# Patient Record
Sex: FEMALE | Race: WHITE | NOT HISPANIC OR LATINO | Employment: UNEMPLOYED | URBAN - NONMETROPOLITAN AREA
[De-identification: names, ages, dates, MRNs, and addresses within clinical notes are randomized per-mention and may not be internally consistent; named-entity substitution may affect disease eponyms.]

---

## 2021-08-30 ENCOUNTER — HOSPITAL ENCOUNTER (EMERGENCY)
Facility: OTHER | Age: 36
Discharge: HOME | End: 2021-08-31
Attending: FAMILY MEDICINE | Admitting: FAMILY MEDICINE

## 2021-08-30 DIAGNOSIS — T78.40XA ALLERGIC REACTION, INITIAL ENCOUNTER: ICD-10-CM

## 2021-08-30 PROCEDURE — 250N000011 HC RX IP 250 OP 636: Performed by: FAMILY MEDICINE

## 2021-08-30 PROCEDURE — 99283 EMERGENCY DEPT VISIT LOW MDM: CPT | Performed by: FAMILY MEDICINE

## 2021-08-30 PROCEDURE — 258N000003 HC RX IP 258 OP 636: Performed by: FAMILY MEDICINE

## 2021-08-30 PROCEDURE — 96365 THER/PROPH/DIAG IV INF INIT: CPT | Performed by: FAMILY MEDICINE

## 2021-08-30 PROCEDURE — 96375 TX/PRO/DX INJ NEW DRUG ADDON: CPT | Performed by: FAMILY MEDICINE

## 2021-08-30 PROCEDURE — 99284 EMERGENCY DEPT VISIT MOD MDM: CPT | Mod: 25 | Performed by: FAMILY MEDICINE

## 2021-08-30 RX ORDER — IBUPROFEN 600 MG/1
600 TABLET, FILM COATED ORAL EVERY 6 HOURS PRN
COMMUNITY

## 2021-08-30 RX ORDER — SERTRALINE HYDROCHLORIDE 100 MG/1
100 TABLET, FILM COATED ORAL DAILY
COMMUNITY

## 2021-08-30 RX ORDER — GABAPENTIN 300 MG/1
600 CAPSULE ORAL 4 TIMES DAILY
COMMUNITY

## 2021-08-30 RX ORDER — METHYLPREDNISOLONE SODIUM SUCCINATE 125 MG/2ML
125 INJECTION, POWDER, LYOPHILIZED, FOR SOLUTION INTRAMUSCULAR; INTRAVENOUS ONCE
Status: COMPLETED | OUTPATIENT
Start: 2021-08-30 | End: 2021-08-30

## 2021-08-30 RX ORDER — LEVOTHYROXINE SODIUM 150 UG/1
150 TABLET ORAL DAILY
COMMUNITY

## 2021-08-30 RX ADMIN — FAMOTIDINE 20 MG: 20 INJECTION, SOLUTION INTRAVENOUS at 22:42

## 2021-08-30 RX ADMIN — SODIUM CHLORIDE 1000 ML: 9 INJECTION, SOLUTION INTRAVENOUS at 22:42

## 2021-08-30 RX ADMIN — METHYLPREDNISOLONE SODIUM SUCCINATE 125 MG: 125 INJECTION, POWDER, FOR SOLUTION INTRAMUSCULAR; INTRAVENOUS at 22:43

## 2021-08-30 ASSESSMENT — MIFFLIN-ST. JEOR: SCORE: 1574.88

## 2021-08-31 VITALS
TEMPERATURE: 97.5 F | BODY MASS INDEX: 32.47 KG/M2 | SYSTOLIC BLOOD PRESSURE: 113 MMHG | HEART RATE: 86 BPM | OXYGEN SATURATION: 96 % | RESPIRATION RATE: 25 BRPM | DIASTOLIC BLOOD PRESSURE: 76 MMHG | HEIGHT: 65 IN | WEIGHT: 194.89 LBS

## 2021-08-31 ASSESSMENT — ENCOUNTER SYMPTOMS
COLOR CHANGE: 0
DIFFICULTY URINATING: 0
ARTHRALGIAS: 0
SHORTNESS OF BREATH: 0
HEADACHES: 0
FEVER: 0
CONFUSION: 0
ABDOMINAL PAIN: 0
NECK STIFFNESS: 0
EYE REDNESS: 0

## 2021-08-31 NOTE — ED TRIAGE NOTES
EMS Arrival Note  ________________________________  Patria Scott is a 36 year old Female [facility]   Pre hospital clinical presentation per patient  and EMS personnel includes possible allergic reaction to peanuts with suspected exposure around 2130 tonight.  Pt reported feeling SOB, facial swelling, wheezing, raspy voice, throat swelling.  Pt adds she has a lot of bruising but hasn't had any injuries, she's scared that something is wrong with her liver, and that this isn't a peanut allergy.  She also reports her back feels swollen & hot, and her fingers feel numb. .  Pre hospital personnel report vital signs of:  B/P 132/87; HR 80,;SpO2 98% RA  Pre hospital care included: Medication: 50 mg Benadryl, 0.5 mg epinephrinePatient arrives with:  GCS Total = 15  Airway intact  Breathing Assessment Normal  Circulation Assessment Normal    Placed in room 901, gowned, warm blanket provided, side rails up,  ID verified and band placed, and call light within reach.       Previous living situation Group Home

## 2021-08-31 NOTE — ED PROVIDER NOTES
History     Chief Complaint   Patient presents with     Allergic Reaction     HPI  Patria Scott is a 36 year old female who presents the ED with allergic reaction.  She states she has history of peanut allergy and may have been exposed to peanut oil tonight.  Patient feeling much better and nearly resolved back to baseline with epinephrine given via EMS.    Reviewed nurses notes below, similar history is related to me.    Patria Scott is a 36 year old Female [facility]   Pre hospital clinical presentation per patient  and EMS personnel includes possible allergic reaction to peanuts with suspected exposure around 2130 tonight.  Pt reported feeling SOB, facial swelling, wheezing, raspy voice, throat swelling.  Pt adds she has a lot of bruising but hasn't had any injuries, she's scared that something is wrong with her liver, and that this isn't a peanut allergy.  She also reports her back feels swollen & hot, and her fingers feel numb. .  Allergies:  Allergies   Allergen Reactions     Bees Anaphylaxis     Peanut-Derived Anaphylaxis     Amoxicillin Angioedema     Penicillins Angioedema     Seroquel [Quetiapine] Nausea and Vomiting     Sulfa Drugs Angioedema     Trazodone Nausea and Vomiting       Problem List:    There are no problems to display for this patient.       Past Medical History:    History reviewed. No pertinent past medical history.    Past Surgical History:    History reviewed. No pertinent surgical history.    Family History:    History reviewed. No pertinent family history.    Social History:  Marital Status:    Social History     Tobacco Use     Smoking status: Current Every Day Smoker     Packs/day: 0.50     Types: Cigarettes     Smokeless tobacco: Never Used   Substance Use Topics     Alcohol use: Yes     Comment: used to drink a lot but quit, now drinks 1-2 times/month     Drug use: Yes     Frequency: 3.0 times per week  "    Types: Marijuana     Comment: BID        Medications:    gabapentin (NEURONTIN) 300 MG capsule  HEMP OIL OR EXTRACT OR OTHER CBD CANNABINOID, NOT MEDICAL CANNABIS,  ibuprofen (ADVIL/MOTRIN) 600 MG tablet  levothyroxine (SYNTHROID/LEVOTHROID) 150 MCG tablet  sertraline (ZOLOFT) 100 MG tablet          Review of Systems   Constitutional: Negative for fever.   HENT: Negative for congestion.    Eyes: Negative for redness.   Respiratory: Negative for shortness of breath.    Cardiovascular: Negative for chest pain.   Gastrointestinal: Negative for abdominal pain.   Genitourinary: Negative for difficulty urinating.   Musculoskeletal: Negative for arthralgias and neck stiffness.   Skin: Negative for color change.   Neurological: Negative for headaches.   Psychiatric/Behavioral: Negative for confusion.       Physical Exam   BP: (!) 136/101  Pulse: 81  Temp: 97.5  F (36.4  C)  Resp: 11  Height: 165.1 cm (5' 5\")  Weight: 88.4 kg (194 lb 14.2 oz)  SpO2: 99 %      Physical Exam  Vitals and nursing note reviewed.   Constitutional:       General: She is not in acute distress.     Appearance: She is not diaphoretic.   HENT:      Head: Atraumatic.      Mouth/Throat:      Pharynx: No oropharyngeal exudate.   Eyes:      General: Allergic shiner present. No scleral icterus.     Pupils: Pupils are equal, round, and reactive to light.   Cardiovascular:      Heart sounds: Normal heart sounds.   Pulmonary:      Effort: No respiratory distress.      Breath sounds: Normal breath sounds.   Abdominal:      General: Bowel sounds are normal.      Palpations: Abdomen is soft.      Tenderness: There is no abdominal tenderness.   Musculoskeletal:         General: No tenderness.   Skin:     General: Skin is warm.      Findings: No rash.     Resolving right supraorbital edema    ED Course        Procedures         No results found for this or any previous visit (from the past 24 hour(s)).    Medications   0.9% sodium chloride BOLUS (1,000 mLs " Intravenous New Bag 8/30/21 2242)   methylPREDNISolone sodium succinate (solu-MEDROL) injection 125 mg (125 mg Intravenous Given 8/30/21 2243)   famotidine (PEPCID) infusion 20 mg (0 mg Intravenous Stopped 8/30/21 2349)       Assessments & Plan (with Medical Decision Making)     I have reviewed the nursing notes.    I have reviewed the findings, diagnosis, plan and need for follow up with the patient.  Monitored in ED for several hours.  Given additional medications to ameliorate symptoms.  Largely the epinephrine resolve symptoms prior to arrival.  An hour after 2 hours of observation patient feels back to baseline with everything except some persistent mild edema above her right eye albeit much less than on arrival.  Return to ED with worsening symptoms.  Avoidance of triggers discussed.  Patient verbalized understanding plans agreement left ED in improved condition.    New Prescriptions    No medications on file       Final diagnoses:   Allergic reaction, initial encounter       8/30/2021   [facility]     Chris Zhong MD  08/31/21 2948

## (undated) RX ORDER — METHYLPREDNISOLONE SODIUM SUCCINATE 125 MG/2ML
INJECTION, POWDER, LYOPHILIZED, FOR SOLUTION INTRAMUSCULAR; INTRAVENOUS
Status: DISPENSED
Start: 2021-08-30

## (undated) RX ORDER — SODIUM CHLORIDE 9 MG/ML
INJECTION, SOLUTION INTRAVENOUS
Status: DISPENSED
Start: 2021-08-30